# Patient Record
(demographics unavailable — no encounter records)

---

## 2019-06-13 NOTE — RAD
EXAM: Single view of the chest



HISTORY:   Cough and wheezing



COMPARISON: 5/2/2015



FINDINGS: Single view of the chest shows a normal sized cardiomediastinal silhouette. There is no vidhi
dence of consolidation, mass, or pleural effusion. The bones are unremarkable.



IMPRESSION: No evidence of acute cardiopulmonary disease



Reported By: David Hardy 

Electronically Signed:  6/13/2019 5:13 PM

## 2019-06-13 NOTE — PDOC.LDPN
Labor & Delivery Progress Note





- Subjective


Subjective: comfortable





- Objective


Vital signs reviewed and normal: yes


General: NAD, breathing through contractions


Uterine fundus: non tender


Dilation: 10


Effacement: 100%


Station: 2+


FHT: category 1, variability present


[place] contractions every: 5-6 minutes





- Assessment


(1)  labor in third trimester


Code(s): O60.03 -  LABOR WITHOUT DELIVERY, THIRD TRIMESTER   Current 

Visit: Yes   Status: Acute   


Qualifiers: 


   Fetus number: single or unspecified fetus 


Comment: 18 y/o  @ 36.6 WGA presents in labor


Patient has been receiving care in [place], but was in town visiting her sister 

and went into labor


She has been progressing on her own and had ROM with thick mec around 4am. 


-SVE 10/100/+2


-She has epidural in place and was started on pitocin due to weak ctx and 

patient getting worn out trying to push. She has pushed for about 30 minutes so 

far.


FHT: Cat I


-pit


-Will resume pushing


-Anticipate    


Plan: pitocin for augmentation


-: 


Dr. Ramsay and Dr. De Paz assuming care of patient due to shift change. Checkout 

was received from Dr. Franklin. 





Addendum - Attending





- Attending Attestation


Date/Time: 19 3974





I personally evaluated the patient and discussed the management with Dr. De Paz, 

assumed care from Dr. Franklin.


I agree with the History, Examination, Assessment and Plan documented above.

## 2019-06-13 NOTE — PDOC.OPDEL
OB Operative/Delivery Note


Delivery Dr/Surgeon: Sobia


Assist: Baker


Pre-Delivery Diagnosis: active labor


Procedure/Post Delivery Dx: spontaneous vaginal delivery


Weeks gestation: 36


Anesthesia: epidural





- Additional Findings/Plan


Placenta delivered: spontaneous


Repaired Obstetrical Laceration: periurethral (L periurethral not requiring 

repair)


Estimated blood loss: QBL pending


Compilations/Other Findings: 


 of viable vigorous male, Apgars 8/8


Light mec suctioned on perineum


Post delivery plan: routine recovery

## 2019-06-14 NOTE — PDOC.OBPPN
FMR OB Postpartum PN: Subj





- Interval History


Hospital Day: 2


Postpartum Day: 


1


16 y/o  delivered via  @ 37.1 WGA at 0903 on 19. 


Patient reports feeling weak, dizzy, and SOB since she was transferred to 

postpartum. She endorses a cough and wheezing for the past three days. The 

inhaler is not helping her very much. She does have a history of asthma. She 

reports her pain is well controlled. She reports minimal lochia. Endorses 

flatus. Tolerating PO. Ambulating, but feels weak and dizzy when she gets up. 





FMR OB Postpartum PN: Obj





- Maternal


Vital signs: 


BP: 108/67  HR: 86 RR: 16 Tmax: 98.5 Pox: 99% on RA  Wt: 89kg   








FMR OB Postpartum PN: Exam





- Physical Exam


General: NAD, awake, alert and oriented


HEENT: EOMI, MMM, grossly normal vision, grossly normal hearing, other (

conjunctival pallor)


Neck: FROM, no LAD


Heart: RRR, normal S1/S2, no murmurs/rubs/gallops, pulses present, no edema


Deviation from normal: diffuse bilateral wheezing, no rales or rhonchi


Abdomen: soft, fundus(cm) (firm 2cm below umbilicus), bowel sound present


Skin: good tugor, capillary refill <2 seconds


Postpartum: appropriately tender


Lymphatic: no unusual bruising or bleeding, no purpura


Psychiatric: intact recent and remote memory, good judgement and insight





FMR OB Postpartum PN: Data





- Labs


Lab results: 


 Laboratory Results - last 24 hr











  19





  20:54 05:34


 


WBC   13.0 H


 


RBC   3.23 L


 


Hgb   6.5 L


 


Hct   21.7 L


 


MCV   67.4 L


 


MCH   20.0 L


 


MCHC   29.7 L


 


RDW   21.6 H


 


Plt Count   236


 


MPV   6.7 L


 


Crossmatch  See Detail 














FMR OB Postpartum PN: A/P





- Problem List


(1) Term pregnancy delivered


Status: Acute   Code(s): O80 - ENCOUNTER FOR FULL-TERM UNCOMPLICATED DELIVERY   


Assessment and Plan: 


Continue routine post-partum care


-PNV


-Encourage breast feeding


-Encourage ambulation


-Regular diet


-Pain controlled with ibuprofen








(2) Anemia affecting pregnancy


Status: Acute   Code(s): O99.019 - ANEMIA COMPLICATING PREGNANCY, UNSPECIFIED 

TRIMESTER   


Assessment and Plan: 


Patient has developed symptomatic anemia with Hb drop from 8.5 -> 6.5


-Will give 1U PRBC's


-PO iron


-Repeat H/H in AM








(3) Teen pregnancy


Status: Acute   Code(s): [id number] -    


Assessment and Plan: 


CM has been consulted








(4) Asthma


Status: Acute   Code(s): J45.909 - UNSPECIFIED ASTHMA, UNCOMPLICATED   


Assessment and Plan: 


Patient having wheezing on exam. 


-Duonebs q4h scheduled, q2h prn


-Monitor O2 sats


-If necessary will give O2 and steroids





Disposition: 


Continue to monitor on post-partum


Discussion: 


Date/Time: 19 6459











This H&P was discussed with Dr. Ramsay who agrees with the above documentation 

and plan.





Signature: 


Ronda De Paz MD, PGY-2





Addendum - Attending





- Attending Attestation


Date/Time: 19 2620





I personally evaluated the patient and discussed the management with Dr. De Paz.


Hgb has fallen to 6.5, will transfuse 1 unit PRBC.


I agree with the History, Examination, Assessment and Plan documented above.

## 2019-06-15 NOTE — PDOC.OBPPN
FMR OB Postpartum PN: Subj





- Interval History


Hospital Day: 3


Postpartum Day: 


2


16 y/o  delivered via  @ 37.1 WGA at 0903 on 19. 


Patient has a h/o asthma and has been coughing and wheezing the past few days, 

but she reports the neb treatments have helped significantly and she is back to 

her baseline. She reports she is no longer feeling weak or dizzy after the 

blood transfusion. She reports her pain is well controlled. She reports minimal 

lochia. Endorses flatus. Tolerating PO. Ambulating without difficulty. 





FMR OB Postpartum PN: Obj





- Maternal


Vital signs: 


BP: 127/74  HR: 57 RR: 18 Tmax: 99.0 Pox: 94% on RA  Wt: 89kg   








- Urine output


I&O: 


 











 06/14/19 06/15/19 06/16/19





 06:59 06:59 06:59


 


Intake Total  350 


 


Balance  350 














FMR OB Postpartum PN: Exam





- Physical Exam


General: NAD, awake, alert and oriented


HEENT: MMM, conjunctiva clear, no scleral icterus, grossly normal vision, 

grossly normal hearing


Heart: RRR, normal S1/S2, no murmurs/rubs/gallops, pulses present, no edema


General: CTAB, no respiratory distress, good air movement, no rales/rhonchi, no 

wheezing


Abdomen: soft, fundus(cm) (firm 3cm below umbilicus), bowel sound present


Musculoskeletal: normal gait and station


Neurological: no clonus, no focal deficit


Skin: good tugor, capillary refill <2 seconds


Postpartum: appropriately tender


Lymphatic: no unusual bruising or bleeding, no purpura


Psychiatric: intact recent and remote memory, good judgement and insight





FMR OB Postpartum PN: Data





- Labs


Lab results: 


 Laboratory Results - last 24 hr











  06/12/19 06/15/19





  20:54 06:20


 


WBC   11.2 H


 


RBC   3.99 L


 


Hgb   8.7 L


 


Hct   28.2 L


 


MCV   70.6 L


 


MCH   21.8 L


 


MCHC   30.9


 


RDW   22.8 H


 


Plt Count   256


 


MPV   6.5 L


 


Blood Type  O POSITIVE 


 


Antibody Screen  NEGATIVE 


 


Crossmatch  See Detail 














FMR OB Postpartum PN: A/P





- Problem List


(1) Term pregnancy delivered


Current Visit: Yes   Status: Acute   Code(s): O80 - ENCOUNTER FOR FULL-TERM 

UNCOMPLICATED DELIVERY   


Assessment and Plan: 


Routine post-partum care


-PNV


-Encourage breast feeding


-Encourage ambulation


-Regular diet


-Pain controlled with ibuprofen








(2) Anemia affecting pregnancy


Current Visit: Yes   Status: Acute   Code(s): O99.019 - ANEMIA COMPLICATING 

PREGNANCY, UNSPECIFIED TRIMESTER   


Assessment and Plan: 


Hb improved from 6.5->8.7 after one unit PRBC's. 


-Continue iron








(3) Teen pregnancy


Current Visit: Yes   Status: Acute   Code(s): LYY3217 -    


Assessment and Plan: 


CM was consulted








(4) Asthma


Current Visit: Yes   Status: Acute   Code(s): J45.909 - UNSPECIFIED ASTHMA, 

UNCOMPLICATED   


Assessment and Plan: 


Continue inhaler use at home prn





Disposition: 


d/c home with f/u in [place] with OB provider


Discussion: 


Date/Time: 06/15/19 0739











This H&P was discussed with Dr. Franklin who agrees with the above documentation 

and plan.





Signature: 


Ronda De Paz MD, PGY-2